# Patient Record
Sex: FEMALE | Race: WHITE | Employment: OTHER | URBAN - METROPOLITAN AREA
[De-identification: names, ages, dates, MRNs, and addresses within clinical notes are randomized per-mention and may not be internally consistent; named-entity substitution may affect disease eponyms.]

---

## 2021-09-13 ENCOUNTER — OFFICE VISIT (OUTPATIENT)
Dept: NEUROLOGY | Facility: CLINIC | Age: 81
End: 2021-09-13

## 2021-09-13 DIAGNOSIS — M54.16 LUMBAR RADICULOPATHY: Primary | ICD-10-CM

## 2021-09-13 PROCEDURE — 95886 MUSC TEST DONE W/N TEST COMP: CPT | Performed by: PSYCHIATRY & NEUROLOGY

## 2021-09-13 PROCEDURE — 95908 NRV CNDJ TST 3-4 STUDIES: CPT | Performed by: PSYCHIATRY & NEUROLOGY

## 2021-09-13 NOTE — PROGRESS NOTES
"  Test Date:  2021    Patient: Maria Del Rosario Laureano : 1940 Physician:    Sex: Female Height: 5' 0\" Ref Phys:    ID#:  Weight: 135 lbs. Technician:      Clinical Information:  81 year old woman with low back pain and left leg pain. Evaluate for radiculopathy.     Techniques:  Motor and sensory conduction studies were done with surface recording electrodes. EMG was done with a concentric needle electrode.       Results:  Nerve conduction studies:   1. Left sural and superficial peroneal sensory responses are normal.   2. Left peroneal-EDB and tibial-AH motor responses are normal.     Needle EM. Fibrillation potentials and positive sharp waves were seen in the left TA and TP muscles.   2. Large amplitude and/or long duration motor unit potentials (MUP) were seen in the left TA, TP and glut medius muscles.   3. Rapidly firing MUPs with reduced recruitment were seen in the left TA muscle.     Interpretation:  This is an abnormal study. There is electrophysiologic evidence of a left-sided lumbar radiculopathy. The presence of active denervation and chronic reinnervation changes in the muscles as discussed above implicates involvement of the left L5 nerve root.       Mj Souza MD  Department of Neurology          Nerve Conduction Studies  Motor Sites      Latency Amplitude Neg. Dur Temperature Segment Delta-O Distance Velocity Comment   Site (ms) Norm (mV) Norm ms  C  ms cm m/s    Left Fibular (EDB) Motor   Ankle 4.8  < 6.0 3.3  > 2.0 5.5 29.6 Ankle-EDB  8     Bel Fib Head 10.5 - 3.0 - 5.4 29.8 Bel Fib Head-Ankle 5.7 26.5 46    Pop Fossa 12.8 - 3.1 - 5.4 30.2 Pop Fossa-Bel Fib Head 2.3 11 48    Left Tibial (AHB) Motor   Ankle 4.1  < 6.5 8.6  > 4.4 7.0 30.4 Ankle-AHB  8     Knee 12.2 - 5.3 - 6.9 30.6 Knee-Ankle 8.1 32 40        Sensory Sites      Onset Lat Amp (O-P) Temperature Segment Delta-O Distance Velocity Comment   Site ms  V Norm  C  ms cm m/s    Left Superficial Fibular Sensory   14 cm-Ankle 2.9 " 4  > 3 30.2 14 cm-Ankle  12.5 43    Left Sural Sensory   Calf-Lat Mall 2.8 5  > 5 30.1 Calf-Lat Mall  14 50        Electromyography     Side Muscle Nerve Root Ins Act Fibs/PSW Fasc HF Amp Dur Poly Recrt Int Pat Comment   Left AHB Tibial,  Medial Plant... S1-S2 Nml Nml Nml 0 Nml Nml 0 Nml Nml    Left Vastus Lat Femoral L2-L4 Nml Nml Nml 0 Nml Nml 0 Nml Nml    Left Tib Anterior Fibular,  Deep Fibula... L4-L5 Incr 2+ Nml CRD Incr Nml 1+ modred Nml    Left Gastroc MH Tibial S1-S2 Nml Nml Nml 0 Nml Nml 0 Nml Nml    Left Tib Posterior Tibial L5-S1 Incr 2+ Nml 0 Incr Incr 0 Nml Nml    Left Gluteus Med Sup Gluteal L5-S1 Nml Nml Nml 0 Incr Nml 1+ Nml Nml    Left Lumbo Parasp (Lower) Rami L5-S1 Nml Nml Nml 0               NCS Waveforms:    Motor         F-Wave       Sensory

## 2021-09-13 NOTE — LETTER
Date:November 19, 2021      Patient was self referred, no letter generated. Do not send.        [facility] Health Information

## 2021-09-13 NOTE — LETTER
"2021       RE: Juli Laureano  [address] [facility] [address]     Dear Colleague,    Thank you for referring your patient, Juli Laureano, to the [facility] [place] at [facility]. Please see a copy of my visit note below.      Test Date:  2021    Patient: Maria Del Rosario Laureano : 1940 Physician:    Sex: Female Height: 5' 0\" Ref Phys:    ID#:  Weight: 135 lbs. Technician:      Clinical Information:  81 year old woman with low back pain and left leg pain. Evaluate for radiculopathy.     Techniques:  Motor and sensory conduction studies were done with surface recording electrodes. EMG was done with a concentric needle electrode.       Results:  Nerve conduction studies:   1. Left sural and superficial peroneal sensory responses are normal.   2. Left peroneal-EDB and tibial-AH motor responses are normal.     Needle EM. Fibrillation potentials and positive sharp waves were seen in the left TA and TP muscles.   2. Large amplitude and/or long duration motor unit potentials (MUP) were seen in the left TA, TP and glut medius muscles.   3. Rapidly firing MUPs with reduced recruitment were seen in the left TA muscle.     Interpretation:  This is an abnormal study. There is electrophysiologic evidence of a left-sided lumbar radiculopathy. The presence of active denervation and chronic reinnervation changes in the muscles as discussed above implicates involvement of the left L5 nerve root.       Mj Souza MD  Department of Neurology          Nerve Conduction Studies  Motor Sites      Latency Amplitude Neg. Dur Temperature Segment Delta-O Distance Velocity Comment   Site (ms) Norm (mV) Norm ms  C  ms cm m/s    Left Fibular (EDB) Motor   Ankle 4.8  < 6.0 3.3  > 2.0 5.5 29.6 Ankle-EDB  8     Bel Fib Head 10.5 - 3.0 - 5.4 29.8 Bel Fib Head-Ankle 5.7 26.5 46    Pop Fossa 12.8 - 3.1 - 5.4 30.2 Pop Fossa-Bel Fib Head 2.3 11 48    Left " Tibial (AHB) Motor   Ankle 4.1  < 6.5 8.6  > 4.4 7.0 30.4 Ankle-AHB  8     Knee 12.2 - 5.3 - 6.9 30.6 Knee-Ankle 8.1 32 40        Sensory Sites      Onset Lat Amp (O-P) Temperature Segment Delta-O Distance Velocity Comment   Site ms  V Norm  C  ms cm m/s    Left Superficial Fibular Sensory   14 cm-Ankle 2.9 4  > 3 30.2 14 cm-Ankle  12.5 43    Left Sural Sensory   Calf-Lat Mall 2.8 5  > 5 30.1 Calf-Lat Mall  14 50        Electromyography     Side Muscle Nerve Root Ins Act Fibs/PSW Fasc HF Amp Dur Poly Recrt Int Pat Comment   Left AHB Tibial,  Medial Plant... S1-S2 Nml Nml Nml 0 Nml Nml 0 Nml Nml    Left Vastus Lat Femoral L2-L4 Nml Nml Nml 0 Nml Nml 0 Nml Nml    Left Tib Anterior Fibular,  Deep Fibula... L4-L5 Incr 2+ Nml CRD Incr Nml 1+ modred Nml    Left Gastroc MH Tibial S1-S2 Nml Nml Nml 0 Nml Nml 0 Nml Nml    Left Tib Posterior Tibial L5-S1 Incr 2+ Nml 0 Incr Incr 0 Nml Nml    Left Gluteus Med Sup Gluteal L5-S1 Nml Nml Nml 0 Incr Nml 1+ Nml Nml    Left Lumbo Parasp (Lower) Rami L5-S1 Nml Nml Nml 0               NCS Waveforms:    Motor         F-Wave       Sensory                     Again, thank you for allowing me to participate in the care of your patient.      Sincerely,    Mj Souza MD

## 2021-10-17 ENCOUNTER — HEALTH MAINTENANCE LETTER (OUTPATIENT)
Age: 81
End: 2021-10-17

## 2021-11-23 ENCOUNTER — OFFICE VISIT (OUTPATIENT)
Dept: NEUROLOGY | Facility: CLINIC | Age: 81
End: 2021-11-23
Attending: PSYCHIATRY & NEUROLOGY
Payer: OTHER GOVERNMENT

## 2021-11-23 ENCOUNTER — TELEPHONE (OUTPATIENT)
Dept: NEUROLOGY | Facility: CLINIC | Age: 81
End: 2021-11-23

## 2021-11-23 DIAGNOSIS — M54.16 LUMBAR RADICULOPATHY: ICD-10-CM

## 2021-11-23 DIAGNOSIS — F03.90 SENILE DEMENTIA WITHOUT BEHAVIORAL DISTURBANCE (H): ICD-10-CM

## 2021-11-23 DIAGNOSIS — Z23 COVID-19 VACCINE ADMINISTERED: Primary | ICD-10-CM

## 2021-11-23 PROCEDURE — 250N000011 HC RX IP 250 OP 636: Performed by: PSYCHIATRY & NEUROLOGY

## 2021-11-23 PROCEDURE — 91301 HC RX IP 250 OP 636: CPT | Performed by: PSYCHIATRY & NEUROLOGY

## 2021-11-23 PROCEDURE — 99202 OFFICE O/P NEW SF 15 MIN: CPT | Performed by: PSYCHIATRY & NEUROLOGY

## 2021-11-23 PROCEDURE — 0013A HC ADMIN COVID VAC MODERNA, 3RD DOSE IMM COMP PT: CPT | Performed by: PSYCHIATRY & NEUROLOGY

## 2021-11-23 RX ADMIN — CX-024414 0.25 ML: 0.2 INJECTION, SUSPENSION INTRAMUSCULAR at 13:24

## 2021-11-23 NOTE — LETTER
Date:January 5, 2022      Patient was self referred, no letter generated. Do not send.        [facility] Health Information

## 2021-11-23 NOTE — LETTER
11/23/2021       RE: Juli Laureano  [address] [facility] [address]     Dear Colleague,    Thank you for referring your patient, Juli Laureano, to the [facility] [place] at [facility]. Please see a copy of my visit note below.      Assessment & Plan   Problem List Items Addressed This Visit     None      Visit Diagnoses     COVID-19 vaccine administered    -  Primary    Relevant Medications    COVID-19 mRNA vacc (Moderna) injection 0.25 mL (Completed)    Lumbar radiculopathy        Senile dementia without behavioral disturbance (H)               Review of prior external note(s) from - EMG  10 minutes spent on the date of the encounter doing patient visit        Continue current management of gabapentin 100 mg po at bedtime and memantine 5 mg po in the AM .    RTC as needed.    Return if symptoms worsen or fail to improve.    Rickey Hickey MD  [facility] [place]    Bridget New is a 81 year old who presents for the following health issues:  Needs COVID 19 booster which is due, she is  accompanied by her sister.    HPI     81 year old  female with Hx. of Left L4-5 radiculopathy and early dementia, currently doing well. Due for COVID booster. We will administer in clinic today as she is traveling out of the country next Sunday.     Report to be doing well and had an appointment for her COVID booster today but was told she could not obtain it from the pharmacy as her second dose was on 6-2-21 and she is not due until 12-2-21. She was told she could only obtain it in the clinic after a face to face visit which is why she is been seen today.    No other issues or concerns.        Impression:    81 year old  female with Hx of Left L4-5 radiculopathy and early dementia, currently doing well. Due for COVID booster. We will administer in clinic today as she is  traveling out of the country next Sunday.      Plan:    Continue current management of gabapentin 100 mg po at bedtime and memantine 5 mg po in the AM .    RTC as needed.          Rickey Hickey MD   Department of Neurology   [phone]         Again, thank you for allowing me to participate in the care of your patient.      Sincerely,    Rickey Hickey MD

## 2021-11-23 NOTE — PATIENT INSTRUCTIONS
Impression:    81 year old  female with Hx of Left L4-5 radiculopathy and early dementia, currently doing well. Due for COVID booster. We will administer in clinic today as she is traveling out of the country next Sunday.      Plan:    Continue current management of gabapentin 100 mg po at bedtime and memantine 5 mg po in the AM .    RTC as needed.

## 2021-11-23 NOTE — PROGRESS NOTES
Assessment & Plan   Problem List Items Addressed This Visit     None      Visit Diagnoses     COVID-19 vaccine administered    -  Primary    Relevant Medications    COVID-19 mRNA vacc (Moderna) injection 0.25 mL (Completed)    Lumbar radiculopathy        Senile dementia without behavioral disturbance (H)               Review of prior external note(s) from - EM[facility]  10 minutes spent on the date of the encounter doing patient visit        Continue current management of gabapentin 100 mg po at bedtime and memantine 5 mg po in the AM .    RTC as needed.    Return if symptoms worsen or fail to improve.    Rickey Hickey MD  [facility] PRASANNA New is a 81 year old who presents for the following health issues:  Needs COVID 19 booster which is due, she is  accompanied by her sister.    HPI     81 year old  female with Hx. of Left L4-5 radiculopathy and early dementia, currently doing well. Due for COVID booster. We will administer in clinic today as she is traveling out of the country next Sunday.     Report to be doing well and had an appointment for her COVID booster today but was told she could not obtain it from the pharmacy as her second dose was on 6-2-21 and she is not due until 12-2-21. She was told she could only obtain it in the clinic after a face to face visit which is why she is been seen today.    No other issues or concerns.        Impression:    81 year old  female with Hx of Left L4-5 radiculopathy and early dementia, currently doing well. Due for COVID booster. We will administer in clinic today as she is traveling out of the country next Sunday.      Plan:    Continue current management of gabapentin 100 mg po at bedtime and memantine 5 mg po in the AM .    RTC as needed.          Rickey Hickey MD   Department of Neurology   [phone]

## 2022-10-03 ENCOUNTER — HEALTH MAINTENANCE LETTER (OUTPATIENT)
Age: 82
End: 2022-10-03

## 2023-02-11 ENCOUNTER — HEALTH MAINTENANCE LETTER (OUTPATIENT)
Age: 83
End: 2023-02-11

## 2024-03-09 ENCOUNTER — HEALTH MAINTENANCE LETTER (OUTPATIENT)
Age: 84
End: 2024-03-09